# Patient Record
Sex: MALE | Race: WHITE | NOT HISPANIC OR LATINO | Employment: OTHER | URBAN - METROPOLITAN AREA
[De-identification: names, ages, dates, MRNs, and addresses within clinical notes are randomized per-mention and may not be internally consistent; named-entity substitution may affect disease eponyms.]

---

## 2017-08-11 ENCOUNTER — IMPORTED ENCOUNTER (OUTPATIENT)
Dept: URBAN - METROPOLITAN AREA CLINIC 50 | Facility: CLINIC | Age: 77
End: 2017-08-11

## 2017-08-14 ENCOUNTER — IMPORTED ENCOUNTER (OUTPATIENT)
Dept: URBAN - METROPOLITAN AREA CLINIC 50 | Facility: CLINIC | Age: 77
End: 2017-08-14

## 2018-10-15 ENCOUNTER — IMPORTED ENCOUNTER (OUTPATIENT)
Dept: URBAN - METROPOLITAN AREA CLINIC 50 | Facility: CLINIC | Age: 78
End: 2018-10-15

## 2019-12-06 ENCOUNTER — IMPORTED ENCOUNTER (OUTPATIENT)
Dept: URBAN - METROPOLITAN AREA CLINIC 50 | Facility: CLINIC | Age: 79
End: 2019-12-06

## 2020-12-14 ENCOUNTER — IMPORTED ENCOUNTER (OUTPATIENT)
Dept: URBAN - METROPOLITAN AREA CLINIC 50 | Facility: CLINIC | Age: 80
End: 2020-12-14

## 2020-12-14 NOTE — PATIENT DISCUSSION
"""Closed duct OD with elevated tear film.  Recommend Lacrimal ballon dialation to open and clean ""

## 2020-12-15 ENCOUNTER — IMPORTED ENCOUNTER (OUTPATIENT)
Dept: URBAN - METROPOLITAN AREA CLINIC 50 | Facility: CLINIC | Age: 80
End: 2020-12-15

## 2021-01-12 ENCOUNTER — IMPORTED ENCOUNTER (OUTPATIENT)
Dept: URBAN - METROPOLITAN AREA CLINIC 50 | Facility: CLINIC | Age: 81
End: 2021-01-12

## 2021-01-12 NOTE — PATIENT DISCUSSION
"""Recommend Nasolacrimal Probe with Balloon Dilation right eye 01/14/2021 at [facility]. Pre op drops: N/A. Post op drops: Maxitrol ophthalmic suspension BID x 10 days. RBA thoroughly explained. Questions answered. Orders and consents signed in chart.  Witnessed by: Juan Trevizo."""

## 2021-01-18 ENCOUNTER — IMPORTED ENCOUNTER (OUTPATIENT)
Dept: URBAN - METROPOLITAN AREA CLINIC 50 | Facility: CLINIC | Age: 81
End: 2021-01-18

## 2021-04-18 ASSESSMENT — VISUAL ACUITY
OS_CC: J1+@ 14 IN
OS_CC: J1
OS_OTHER: 20/40. 20/40.
OD_CC: J1+
OD_BAT: 20/30-2
OD_OTHER: 20/30-2. 20/40.
OD_CC: J1+
OD_CC: 20/20
OS_CC: 20/20
OS_CC: 20/30
OS_CC: J1+
OD_CC: 20/30
OD_CC: 20/30
OD_OTHER: 20/40. 20/50.
OS_CC: 20/25
OD_OTHER: 20/40. 20/50.
OS_CC: 20/30-3
OD_CC: J1+@ 14 IN
OS_OTHER: 20/30-2. 20/40.
OS_BAT: 20/40
OD_BAT: 20/40
OS_BAT: 20/40
OS_OTHER: 20/40. 20/50.
OD_BAT: 20/40
OS_BAT: 20/40
OD_OTHER: 20/40. 20/50.
OD_CC: J1
OD_CC: 20/30
OS_CC: J1+
OD_BAT: 20/40
OS_OTHER: 20/40. 20/50.
OS_BAT: 20/30-2
OD_CC: 20/40
OS_CC: 20/30

## 2021-04-18 ASSESSMENT — TONOMETRY
OD_IOP_MMHG: 14
OD_IOP_MMHG: 15
OD_IOP_MMHG: 11
OD_IOP_MMHG: 14
OS_IOP_MMHG: 14
OS_IOP_MMHG: 12
OD_IOP_MMHG: 13
OS_IOP_MMHG: 14
OS_IOP_MMHG: 15
OS_IOP_MMHG: 14

## 2022-03-23 ENCOUNTER — PREPPED CHART (OUTPATIENT)
Dept: URBAN - METROPOLITAN AREA CLINIC 52 | Facility: CLINIC | Age: 82
End: 2022-03-23

## 2022-04-22 ENCOUNTER — ESTABLISHED PATIENT (OUTPATIENT)
Dept: URBAN - METROPOLITAN AREA CLINIC 52 | Facility: CLINIC | Age: 82
End: 2022-04-22

## 2022-04-22 DIAGNOSIS — D31.32: ICD-10-CM

## 2022-04-22 DIAGNOSIS — H25.13: ICD-10-CM

## 2022-04-22 DIAGNOSIS — C44.1122: ICD-10-CM

## 2022-04-22 DIAGNOSIS — D48.5: ICD-10-CM

## 2022-04-22 PROCEDURE — 92014 COMPRE OPH EXAM EST PT 1/>: CPT

## 2022-04-22 ASSESSMENT — VISUAL ACUITY
OD_CC: 20/30
OD_GLARE: 20/30
OU_CC: J1+
OS_CC: 20/30
OS_GLARE: 20/30
OD_GLARE: 20/40
OS_GLARE: 20/40

## 2022-04-22 ASSESSMENT — TONOMETRY
OS_IOP_MMHG: 15
OD_IOP_MMHG: 14

## 2022-04-22 NOTE — PATIENT DISCUSSION
Recommended removal due to suspicious appearance. Will schedule for excision at the [facility].

## 2022-07-22 ENCOUNTER — PRE-OP/H&P (OUTPATIENT)
Dept: URBAN - METROPOLITAN AREA CLINIC 52 | Facility: CLINIC | Age: 82
End: 2022-07-22

## 2022-07-22 VITALS — HEART RATE: 52 BPM | DIASTOLIC BLOOD PRESSURE: 82 MMHG | HEIGHT: 60 IN | SYSTOLIC BLOOD PRESSURE: 126 MMHG

## 2022-07-22 DIAGNOSIS — C44.1122: ICD-10-CM

## 2022-07-22 DIAGNOSIS — D48.5: ICD-10-CM

## 2022-07-22 PROCEDURE — PREOP PRE OP VISIT

## 2022-07-22 RX ORDER — ERYTHROMYCIN 5 MG/G
1 OINTMENT OPHTHALMIC
Start: 2022-07-22

## 2022-07-22 ASSESSMENT — VISUAL ACUITY
OS_CC: 20/25-2
OD_CC: 20/30

## 2022-07-22 NOTE — PATIENT DISCUSSION
Recommended removal due to suspicious appearance. Will schedule for excision at the [facility].

## 2022-07-26 ENCOUNTER — SURGERY/PROCEDURE (OUTPATIENT)
Dept: URBAN - METROPOLITAN AREA SURGERY 16 | Facility: SURGERY | Age: 82
End: 2022-07-26

## 2022-07-26 DIAGNOSIS — C44.1122: ICD-10-CM

## 2022-07-26 PROCEDURE — 67840 REMOVE EYELID LESION: CPT

## 2022-08-05 ENCOUNTER — POST-OP (OUTPATIENT)
Dept: URBAN - METROPOLITAN AREA CLINIC 52 | Facility: CLINIC | Age: 82
End: 2022-08-05

## 2022-08-05 DIAGNOSIS — D48.5: ICD-10-CM

## 2022-08-05 DIAGNOSIS — C44.1122: ICD-10-CM

## 2022-08-05 DIAGNOSIS — Z98.890: ICD-10-CM

## 2022-08-05 PROCEDURE — 99024 POSTOP FOLLOW-UP VISIT: CPT

## 2022-08-05 ASSESSMENT — VISUAL ACUITY
OS_CC: 20/25-2
OD_CC: 20/30-1

## 2022-08-05 NOTE — PATIENT DISCUSSION
1 week s/p excision RLL. Patient is healing well. Sutures removed today at slit lamp with verbal consent. Continue Erythromycin ointment QHS x 3 days then discontinue.

## 2022-08-26 ENCOUNTER — POST-OP (OUTPATIENT)
Dept: URBAN - METROPOLITAN AREA CLINIC 52 | Facility: CLINIC | Age: 82
End: 2022-08-26

## 2022-08-26 DIAGNOSIS — C44.1122: ICD-10-CM

## 2022-08-26 DIAGNOSIS — Z98.890: ICD-10-CM

## 2022-08-26 PROCEDURE — 99024 POSTOP FOLLOW-UP VISIT: CPT

## 2022-08-26 ASSESSMENT — VISUAL ACUITY
OU_CC: 20/25
OD_CC: 20/40+1
OD_PH: 20/30
OS_PH: 20/25
OS_CC: 20/30-1

## 2023-04-14 ENCOUNTER — COMPREHENSIVE EXAM (OUTPATIENT)
Dept: URBAN - METROPOLITAN AREA CLINIC 52 | Facility: CLINIC | Age: 83
End: 2023-04-14

## 2023-04-14 DIAGNOSIS — D31.32: ICD-10-CM

## 2023-04-14 DIAGNOSIS — H18.063: ICD-10-CM

## 2023-04-14 DIAGNOSIS — H43.813: ICD-10-CM

## 2023-04-14 DIAGNOSIS — H35.362: ICD-10-CM

## 2023-04-14 DIAGNOSIS — H25.13: ICD-10-CM

## 2023-04-14 PROCEDURE — 92134 CPTRZ OPH DX IMG PST SGM RTA: CPT

## 2023-04-14 PROCEDURE — 92014 COMPRE OPH EXAM EST PT 1/>: CPT

## 2023-04-14 ASSESSMENT — VISUAL ACUITY
OD_GLARE: 20/60
OS_GLARE: 20/60
OD_CC: 20/30-1
OS_CC: 20/40+1
OD_GLARE: 20/70
OS_GLARE: 20/40
OU_CC: J1+

## 2023-04-14 ASSESSMENT — TONOMETRY
OS_IOP_MMHG: 13
OD_IOP_MMHG: 15

## 2023-07-24 ENCOUNTER — PRE-OP/H&P (OUTPATIENT)
Dept: URBAN - METROPOLITAN AREA CLINIC 50 | Facility: CLINIC | Age: 83
End: 2023-07-24

## 2023-07-24 DIAGNOSIS — H35.373: ICD-10-CM

## 2023-07-24 DIAGNOSIS — H35.362: ICD-10-CM

## 2023-07-24 DIAGNOSIS — H25.13: ICD-10-CM

## 2023-07-24 PROCEDURE — 92136 OPHTHALMIC BIOMETRY: CPT

## 2023-07-24 PROCEDURE — 92134 CPTRZ OPH DX IMG PST SGM RTA: CPT

## 2023-07-24 PROCEDURE — PREOP PRE OP VISIT

## 2023-07-24 ASSESSMENT — KERATOMETRY
OD_K2POWER_DIOPTERS: 43.37
OS_K2POWER_DIOPTERS: 43.87
OD_AXISANGLE2_DEGREES: 165
OD_AXISANGLE_DEGREES: 75
OS_K1POWER_DIOPTERS: 44.50
OS_AXISANGLE_DEGREES: 115
OS_AXISANGLE2_DEGREES: 25
OD_K1POWER_DIOPTERS: 44.50

## 2023-07-24 ASSESSMENT — TONOMETRY
OD_IOP_MMHG: 15
OS_IOP_MMHG: 14

## 2023-07-24 ASSESSMENT — VISUAL ACUITY
OD_CC: 20/25
OS_CC: 20/30-2
OS_PH: 20/25-1
OU_CC: 20/25+2

## 2023-08-03 ENCOUNTER — SURGERY/PROCEDURE (OUTPATIENT)
Dept: URBAN - METROPOLITAN AREA SURGERY 16 | Facility: SURGERY | Age: 83
End: 2023-08-03

## 2023-08-03 ENCOUNTER — POST-OP (OUTPATIENT)
Dept: URBAN - METROPOLITAN AREA CLINIC 48 | Facility: LOCATION | Age: 83
End: 2023-08-03

## 2023-08-03 DIAGNOSIS — Z98.41: ICD-10-CM

## 2023-08-03 DIAGNOSIS — H25.811: ICD-10-CM

## 2023-08-03 DIAGNOSIS — Z96.1: ICD-10-CM

## 2023-08-03 PROCEDURE — 66984 XCAPSL CTRC RMVL W/O ECP: CPT

## 2023-08-03 ASSESSMENT — KERATOMETRY
OS_AXISANGLE_DEGREES: 115
OD_K2POWER_DIOPTERS: 43.37
OS_K1POWER_DIOPTERS: 44.50
OD_AXISANGLE_DEGREES: 75
OD_AXISANGLE2_DEGREES: 165
OD_AXISANGLE_DEGREES: 75
OS_AXISANGLE_DEGREES: 115
OD_K1POWER_DIOPTERS: 44.50
OS_K1POWER_DIOPTERS: 44.50
OD_K2POWER_DIOPTERS: 43.37
OD_AXISANGLE2_DEGREES: 165
OS_AXISANGLE2_DEGREES: 25
OS_AXISANGLE2_DEGREES: 25
OD_K1POWER_DIOPTERS: 44.50
OS_K2POWER_DIOPTERS: 43.87
OS_K2POWER_DIOPTERS: 43.87

## 2023-08-03 ASSESSMENT — VISUAL ACUITY: OD_SC: 20/50-1

## 2023-08-03 ASSESSMENT — TONOMETRY: OD_IOP_MMHG: 4

## 2023-08-07 ENCOUNTER — PRE-OP/H&P (OUTPATIENT)
Dept: URBAN - METROPOLITAN AREA CLINIC 50 | Facility: LOCATION | Age: 83
End: 2023-08-07

## 2023-08-07 DIAGNOSIS — H25.812: ICD-10-CM

## 2023-08-07 DIAGNOSIS — Z96.1: ICD-10-CM

## 2023-08-07 PROCEDURE — 99213 OFFICE O/P EST LOW 20 MIN: CPT

## 2023-08-07 PROCEDURE — 92136 - 2N OPHTHALMIC BIOMETRY BY PARTIAL COHERENCE INTERFEROMETRY WITH INTRAOCULAR LENS POWER CALCULATION

## 2023-08-07 ASSESSMENT — TONOMETRY
OD_IOP_MMHG: 14
OS_IOP_MMHG: 16

## 2023-08-07 ASSESSMENT — VISUAL ACUITY
OS_CC: 20/25
OS_GLARE: 20/40
OD_SC: 20/25-1
OS_GLARE: 20/30

## 2023-08-17 ENCOUNTER — POST-OP (OUTPATIENT)
Dept: URBAN - METROPOLITAN AREA CLINIC 49 | Facility: LOCATION | Age: 83
End: 2023-08-17

## 2023-08-17 ENCOUNTER — SURGERY/PROCEDURE (OUTPATIENT)
Dept: URBAN - METROPOLITAN AREA SURGERY 16 | Facility: SURGERY | Age: 83
End: 2023-08-17

## 2023-08-17 DIAGNOSIS — Z98.42: ICD-10-CM

## 2023-08-17 DIAGNOSIS — Z96.1: ICD-10-CM

## 2023-08-17 DIAGNOSIS — H25.812: ICD-10-CM

## 2023-08-17 PROCEDURE — 66984 XCAPSL CTRC RMVL W/O ECP: CPT

## 2023-08-17 ASSESSMENT — TONOMETRY: OS_IOP_MMHG: 21

## 2023-08-17 ASSESSMENT — VISUAL ACUITY: OS_SC: 20/30

## 2023-08-21 ENCOUNTER — POST-OP (OUTPATIENT)
Dept: URBAN - METROPOLITAN AREA CLINIC 50 | Facility: LOCATION | Age: 83
End: 2023-08-21

## 2023-08-21 DIAGNOSIS — Z98.42: ICD-10-CM

## 2023-08-21 DIAGNOSIS — Z96.1: ICD-10-CM

## 2023-08-21 PROCEDURE — 99024 POSTOP FOLLOW-UP VISIT: CPT

## 2023-08-21 ASSESSMENT — TONOMETRY
OS_IOP_MMHG: 18
OD_IOP_MMHG: 18

## 2023-08-21 ASSESSMENT — VISUAL ACUITY
OU_CC: J1+
OD_SC: 20/30+2
OS_SC: 20/25
OU_SC: 20/25

## 2023-09-11 ENCOUNTER — POST-OP (OUTPATIENT)
Dept: URBAN - METROPOLITAN AREA CLINIC 50 | Facility: LOCATION | Age: 83
End: 2023-09-11

## 2023-09-11 DIAGNOSIS — H35.373: ICD-10-CM

## 2023-09-11 DIAGNOSIS — H04.123: ICD-10-CM

## 2023-09-11 DIAGNOSIS — Z98.41: ICD-10-CM

## 2023-09-11 DIAGNOSIS — H43.813: ICD-10-CM

## 2023-09-11 DIAGNOSIS — H35.362: ICD-10-CM

## 2023-09-11 DIAGNOSIS — Z98.42: ICD-10-CM

## 2023-09-11 PROCEDURE — 92015 DETERMINE REFRACTIVE STATE: CPT

## 2023-09-11 PROCEDURE — 92134 CPTRZ OPH DX IMG PST SGM RTA: CPT

## 2023-09-11 PROCEDURE — 99024 POSTOP FOLLOW-UP VISIT: CPT

## 2023-09-11 ASSESSMENT — VISUAL ACUITY
OD_SC: 20/25-1
OS_SC: 20/20
OU_SC: 20/20
OU_SC: J7@14"
OU_CC: J1+@14"

## 2023-09-11 ASSESSMENT — TONOMETRY
OS_IOP_MMHG: 16
OD_IOP_MMHG: 16

## 2024-01-03 NOTE — PATIENT DISCUSSION
Recommended removal due to suspicious appearance. Will schedule for excision at the [facility]. Alert and oriented, no focal deficits, no motor or sensory deficits.

## 2024-05-10 ENCOUNTER — CONSULTATION/EVALUATION (OUTPATIENT)
Dept: URBAN - METROPOLITAN AREA CLINIC 52 | Facility: CLINIC | Age: 84
End: 2024-05-10

## 2024-05-10 DIAGNOSIS — Z98.890: ICD-10-CM

## 2024-05-10 DIAGNOSIS — D48.5: ICD-10-CM

## 2024-05-10 PROCEDURE — 92285 EXTERNAL OCULAR PHOTOGRAPHY: CPT

## 2024-05-10 PROCEDURE — 99213 OFFICE O/P EST LOW 20 MIN: CPT

## 2024-05-10 ASSESSMENT — TONOMETRY
OD_IOP_MMHG: 14
OS_IOP_MMHG: 14

## 2024-05-10 ASSESSMENT — VISUAL ACUITY
OD_SC: 20/25-1
OS_SC: 20/20

## 2024-05-31 ENCOUNTER — PRE-OP/H&P (OUTPATIENT)
Dept: URBAN - METROPOLITAN AREA CLINIC 52 | Facility: CLINIC | Age: 84
End: 2024-05-31

## 2024-05-31 DIAGNOSIS — D48.5: ICD-10-CM

## 2024-05-31 PROCEDURE — PREOP PRE OP VISIT

## 2024-05-31 ASSESSMENT — TONOMETRY
OD_IOP_MMHG: 12
OS_IOP_MMHG: 13

## 2024-05-31 ASSESSMENT — VISUAL ACUITY
OS_SC: 20/20
OD_SC: 20/25-2
OU_SC: 20/20

## 2024-06-04 ENCOUNTER — SURGERY/PROCEDURE (OUTPATIENT)
Dept: URBAN - METROPOLITAN AREA SURGERY 16 | Facility: SURGERY | Age: 84
End: 2024-06-04

## 2024-06-04 DIAGNOSIS — C44.1122: ICD-10-CM

## 2024-06-04 PROCEDURE — 11640 EXC F/E/E/N/L MAL+MRG 0.5CM<: CPT

## 2024-06-14 ENCOUNTER — POST-OP (OUTPATIENT)
Dept: URBAN - METROPOLITAN AREA CLINIC 52 | Facility: CLINIC | Age: 84
End: 2024-06-14

## 2024-06-14 DIAGNOSIS — Z98.890: ICD-10-CM

## 2024-06-14 PROCEDURE — 66999PO NON CO-MANAGED OTHER SURGERY PO

## 2024-06-14 ASSESSMENT — VISUAL ACUITY
OS_SC: 20/20-1
OD_SC: 20/20

## 2024-07-19 ENCOUNTER — POST-OP (OUTPATIENT)
Dept: URBAN - METROPOLITAN AREA CLINIC 52 | Facility: CLINIC | Age: 84
End: 2024-07-19

## 2024-07-19 DIAGNOSIS — Z98.890: ICD-10-CM

## 2024-07-19 PROCEDURE — 66999PO NON CO-MANAGED OTHER SURGERY PO: Mod: NC

## 2025-01-06 ENCOUNTER — COMPREHENSIVE EXAM (OUTPATIENT)
Age: 85
End: 2025-01-06

## 2025-01-06 DIAGNOSIS — H35.373: ICD-10-CM

## 2025-01-06 DIAGNOSIS — H04.123: ICD-10-CM

## 2025-01-06 DIAGNOSIS — Z96.1: ICD-10-CM

## 2025-01-06 DIAGNOSIS — D31.32: ICD-10-CM

## 2025-01-06 DIAGNOSIS — H43.813: ICD-10-CM

## 2025-01-06 PROCEDURE — 92134 CPTRZ OPH DX IMG PST SGM RTA: CPT

## 2025-01-06 PROCEDURE — 99214 OFFICE O/P EST MOD 30 MIN: CPT

## 2025-07-07 ENCOUNTER — COMPREHENSIVE EXAM (OUTPATIENT)
Age: 85
End: 2025-07-07

## 2025-07-07 DIAGNOSIS — H26.493: ICD-10-CM

## 2025-07-07 DIAGNOSIS — D31.32: ICD-10-CM

## 2025-07-07 DIAGNOSIS — H43.813: ICD-10-CM

## 2025-07-07 DIAGNOSIS — H35.3131: ICD-10-CM

## 2025-07-07 DIAGNOSIS — H35.373: ICD-10-CM

## 2025-07-07 DIAGNOSIS — H04.123: ICD-10-CM

## 2025-07-07 PROCEDURE — 92134 CPTRZ OPH DX IMG PST SGM RTA: CPT

## 2025-07-07 PROCEDURE — 99214 OFFICE O/P EST MOD 30 MIN: CPT
